# Patient Record
Sex: FEMALE | Race: WHITE | NOT HISPANIC OR LATINO | Employment: OTHER | URBAN - NONMETROPOLITAN AREA
[De-identification: names, ages, dates, MRNs, and addresses within clinical notes are randomized per-mention and may not be internally consistent; named-entity substitution may affect disease eponyms.]

---

## 2018-08-06 ENCOUNTER — OFFICE VISIT (OUTPATIENT)
Dept: SURGERY | Facility: CLINIC | Age: 58
End: 2018-08-06

## 2018-08-06 DIAGNOSIS — K82.8 BILIARY DYSKINESIA: Primary | ICD-10-CM

## 2018-08-06 DIAGNOSIS — Z85.038 PERSONAL HISTORY OF COLON CANCER: ICD-10-CM

## 2018-08-06 PROCEDURE — 99244 OFF/OP CNSLTJ NEW/EST MOD 40: CPT | Performed by: SURGERY

## 2018-08-06 RX ORDER — SIMVASTATIN 40 MG
TABLET ORAL
Refills: 5 | COMMUNITY
Start: 2018-07-10

## 2018-08-06 RX ORDER — CLONIDINE HYDROCHLORIDE 0.1 MG/1
0.1 TABLET ORAL DAILY
Refills: 5 | COMMUNITY
Start: 2018-07-10

## 2018-08-06 RX ORDER — QUETIAPINE FUMARATE 100 MG/1
TABLET, FILM COATED ORAL
Refills: 5 | COMMUNITY
Start: 2018-07-10 | End: 2019-06-18 | Stop reason: SDUPTHER

## 2018-08-06 RX ORDER — ATENOLOL 50 MG/1
50 TABLET ORAL DAILY
Refills: 5 | COMMUNITY
Start: 2018-07-10

## 2018-08-06 RX ORDER — LEVOTHYROXINE SODIUM 0.15 MG/1
150 TABLET ORAL DAILY
Refills: 5 | COMMUNITY
Start: 2018-07-10

## 2018-08-06 RX ORDER — OMEPRAZOLE 20 MG/1
20 CAPSULE, DELAYED RELEASE ORAL DAILY
Refills: 5 | COMMUNITY
Start: 2018-07-10 | End: 2019-08-19 | Stop reason: SDUPTHER

## 2018-08-06 RX ORDER — FENOFIBRATE 200 MG/1
200 CAPSULE ORAL DAILY
Refills: 5 | COMMUNITY
Start: 2018-07-10

## 2018-08-06 RX ORDER — CITALOPRAM 10 MG/1
10 TABLET ORAL DAILY
Refills: 5 | COMMUNITY
Start: 2018-07-26 | End: 2019-07-29

## 2018-08-06 RX ORDER — ESCITALOPRAM OXALATE 10 MG/1
10 TABLET ORAL
Refills: 2 | COMMUNITY
Start: 2018-07-26 | End: 2019-06-18 | Stop reason: SDUPTHER

## 2018-08-06 RX ORDER — HYDROXYZINE HYDROCHLORIDE 25 MG/1
TABLET, FILM COATED ORAL
Refills: 5 | COMMUNITY
Start: 2018-07-10 | End: 2019-06-18 | Stop reason: SDUPTHER

## 2018-08-06 RX ORDER — DICYCLOMINE HYDROCHLORIDE 10 MG/1
CAPSULE ORAL
Refills: 1 | COMMUNITY
Start: 2018-07-26

## 2018-08-06 RX ORDER — BUSPIRONE HYDROCHLORIDE 10 MG/1
TABLET ORAL
Refills: 2 | COMMUNITY
Start: 2018-07-26 | End: 2019-06-18 | Stop reason: SDUPTHER

## 2018-08-06 NOTE — PROGRESS NOTES
Patient: Maru Meyer    YOB: 1960    Date: 08/06/2018    Primary Care Provider: Haleigh Gomez MD    Chief Complaint   Patient presents with   • Abdominal Pain     epigastric pain       Subjective .     History of present illness:  Patient with a 2-3 year history of abdominal pain and nausea and sometimes dry heaves.  5 or 6 times over that period of time she's had severe dry heaving and nausea but otherwise usually only has some nausea and crampy upper abdominal pain.  The pain is across the upper abdomen.  Nothing specifically aggravates or alleviates her symptoms although she has started dicyclomine and that has helped somewhat.  She underwent a HIDA scan that did not reproduce her pain but she did have nausea with it.    The following portions of the patient's history were reviewed and updated as appropriate: allergies, current medications, past family history, past medical history, past social history, past surgical history and problem list.    Review of Systems   Constitutional: Negative for chills, fever and unexpected weight change.   HENT: Negative for hearing loss, trouble swallowing and voice change.    Eyes: Negative for visual disturbance.   Respiratory: Negative for apnea, cough, chest tightness, shortness of breath and wheezing.    Cardiovascular: Negative for chest pain, palpitations and leg swelling.   Gastrointestinal: Positive for abdominal pain, diarrhea, nausea and vomiting. Negative for abdominal distention, anal bleeding, blood in stool, constipation and rectal pain.   Endocrine: Negative for cold intolerance and heat intolerance.   Genitourinary: Negative for difficulty urinating, dysuria and flank pain.   Musculoskeletal: Negative for back pain and gait problem.   Skin: Negative for color change, rash and wound.   Neurological: Negative for dizziness, syncope, speech difficulty, weakness, light-headedness, numbness and headaches.   Hematological: Negative for adenopathy.  Does not bruise/bleed easily.   Psychiatric/Behavioral: Negative for confusion. The patient is not nervous/anxious.        History:  Past Medical History:   Diagnosis Date   • Anxiety    • Cancer (CMS/HCC)     colon   • Colon cancer (CMS/HCC)    • Disease of thyroid gland    • Hyperlipidemia    • Hypertension        Past Surgical History:   Procedure Laterality Date   • COLECTOMY PARTIAL / TOTAL     • MASTECTOMY     • THYROIDECTOMY         Family History   Problem Relation Age of Onset   • Thyroid cancer Mother    • Heart disease Mother    • Stroke Father    • Cancer Father    • Cancer Maternal Uncle    • Heart disease Maternal Grandmother    • Heart attack Paternal Grandmother    • Heart disease Paternal Grandmother    • Cancer Paternal Grandfather        Social History   Substance Use Topics   • Smoking status: Former Smoker   • Smokeless tobacco: Never Used   • Alcohol use No       Allergies:  No Known Allergies    Medications:    Current Outpatient Prescriptions:   •  atenolol (TENORMIN) 50 MG tablet, Take 50 mg by mouth Daily., Disp: , Rfl: 5  •  busPIRone (BUSPAR) 10 MG tablet, TAKE 1 2 TABLET TWICE DAILY FOR 7 DAYS, THEN 1 TABLET TWICE DAILY, Disp: , Rfl: 2  •  citalopram (CeleXA) 10 MG tablet, Take 10 mg by mouth Daily., Disp: , Rfl: 5  •  CloNIDine (CATAPRES) 0.1 MG tablet, Take 0.1 mg by mouth Daily., Disp: , Rfl: 5  •  dicyclomine (BENTYL) 10 MG capsule, TAKE 1 CAPSULE EVERY 6 HOURS AS NEEDED FOR SPASMS, Disp: , Rfl: 1  •  escitalopram (LEXAPRO) 10 MG tablet, Take 10 mg by mouth every night at bedtime., Disp: , Rfl: 2  •  fenofibrate micronized (LOFIBRA) 200 MG capsule, Take 200 mg by mouth Daily., Disp: , Rfl: 5  •  hydrOXYzine (ATARAX) 25 MG tablet, TAKE 1 2 TABLETS EVERY 6 HOURS, Disp: , Rfl: 5  •  levothyroxine (SYNTHROID, LEVOTHROID) 150 MCG tablet, Take 150 mcg by mouth Daily., Disp: , Rfl: 5  •  omeprazole (priLOSEC) 20 MG capsule, Take 20 mg by mouth Daily., Disp: , Rfl: 5  •  QUEtiapine  (SEROquel) 100 MG tablet, TAKE 1 5 4 TABLETS AT BEDTIME, Disp: , Rfl: 5  •  simvastatin (ZOCOR) 40 MG tablet, TAKE 1 TABLET EACH EVENING, Disp: , Rfl: 5    Objective     Vital Signs:   There were no vitals filed for this visit.    Physical Exam:   General Appearance:    Alert, cooperative, in no acute distress   Head:    Normocephalic, without obvious abnormality, atraumatic   Eyes:            Lids and lashes normal, conjunctivae and sclerae normal, no   icterus, no pallor, corneas clear, PERRLA   Ears:    Ears appear intact with no abnormalities noted   Lungs:     Clear to auscultation,respirations regular, even and                  unlabored    Heart:    Regular rhythm and normal rate, normal S1 and S2, no            murmur   Abdomen:     no masses, no organomegaly, soft non-tender, non-distended, no guarding,Well-healed midline incision.     Extremities:   Moves all extremities well, no edema, no cyanosis, no             redness   Skin:   No bleeding, bruising or rash   Neurologic:   Cranial nerves 2 - 12 grossly intact, sensation intact   Psychiatric: No evidence of depression or anxiety      Results Review:   I reviewed the patient's new clinical results.  Labs, ultrasound and HIDA scan were reviewed    Review of Systems was reviewed and confirmed as accurate today.    Assessment/Plan :    1. Biliary dyskinesia    2. Personal history of colon cancer        I had a detailed conversation with the patient.  She does have an abnormal HIDA scan and I explained to her this diagnosis.  Certainly the gallbladder can be the etiology for her symptoms but she understands that in 10-15% of time removing the gallbladder will not help.  Extremely anxious to have any surgical procedures performed and is not immediately interested in having her gallbladder surgery.  She is most concerned about the risks of not operating and I explained to her that the risks are relatively minimal and would include acute cholecystitis which may  mean more urgent surgery.  She is at somewhat higher risk given her colectomy in terms of possible adhesions etc.  She understands this.  Patient wants to give it some more thought and will follow-up with me if she wants to have her gallbladder removed.  Patient also with personal history of colon cancer and a total proctocolectomy was recommended at the time but underwent a subtotal colectomy and has not had a flexible sigmoidoscopy in 11 years.  I explained to her that this is important since she had multiple colon cancers leading to that surgery.  She'll bring me her previous surgical details and we'll discuss endoscopy but for now she does not wish to proceed.  Again she is somewhat anxious about any procedure.  I think there is minimal risk with endoscopy.    Electronically signed by Teja Beltrán MD  08/06/18    Portions of this note have been scribed for Teja Beltrán MD by Michelle Milligan. 8/6/2018  4:20 PM

## 2019-06-18 ENCOUNTER — OFFICE VISIT (OUTPATIENT)
Dept: PSYCHIATRY | Facility: CLINIC | Age: 59
End: 2019-06-18

## 2019-06-18 VITALS
DIASTOLIC BLOOD PRESSURE: 73 MMHG | SYSTOLIC BLOOD PRESSURE: 147 MMHG | HEIGHT: 66 IN | WEIGHT: 244 LBS | BODY MASS INDEX: 39.21 KG/M2 | HEART RATE: 61 BPM

## 2019-06-18 DIAGNOSIS — F51.05 INSOMNIA DUE TO MENTAL CONDITION: ICD-10-CM

## 2019-06-18 DIAGNOSIS — F41.1 GENERALIZED ANXIETY DISORDER: ICD-10-CM

## 2019-06-18 DIAGNOSIS — F33.2 SEVERE EPISODE OF RECURRENT MAJOR DEPRESSIVE DISORDER, WITHOUT PSYCHOTIC FEATURES (HCC): Primary | ICD-10-CM

## 2019-06-18 PROCEDURE — 90792 PSYCH DIAG EVAL W/MED SRVCS: CPT | Performed by: NURSE PRACTITIONER

## 2019-06-18 RX ORDER — HYDROXYZINE HYDROCHLORIDE 25 MG/1
TABLET, FILM COATED ORAL
Qty: 180 TABLET | Refills: 2
Start: 2019-06-18

## 2019-06-18 RX ORDER — QUETIAPINE FUMARATE 300 MG/1
300 TABLET, FILM COATED ORAL NIGHTLY
Qty: 30 TABLET | Refills: 2
Start: 2019-06-18 | End: 2019-08-19 | Stop reason: SDUPTHER

## 2019-06-18 RX ORDER — FAMOTIDINE 20 MG/1
20 TABLET, FILM COATED ORAL
Refills: 2 | COMMUNITY
Start: 2019-04-03 | End: 2019-08-19 | Stop reason: SDUPTHER

## 2019-06-18 RX ORDER — BUSPIRONE HYDROCHLORIDE 10 MG/1
10 TABLET ORAL 2 TIMES DAILY
Qty: 60 TABLET | Refills: 0
Start: 2019-06-18 | End: 2019-08-19 | Stop reason: SDUPTHER

## 2019-06-18 RX ORDER — ESCITALOPRAM OXALATE 10 MG/1
15 TABLET ORAL
Qty: 45 TABLET | Refills: 2 | Status: SHIPPED | OUTPATIENT
Start: 2019-06-18 | End: 2019-07-29 | Stop reason: SDUPTHER
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
## 2019-07-29 RX ORDER — ESCITALOPRAM OXALATE 10 MG/1
TABLET ORAL
Qty: 45 TABLET | Refills: 0 | Status: SHIPPED | OUTPATIENT
Start: 2019-07-29 | End: 2019-07-31 | Stop reason: SDUPTHER

## 2019-07-30 ENCOUNTER — TELEPHONE (OUTPATIENT)
Dept: PSYCHIATRY | Facility: CLINIC | Age: 59
End: 2019-07-30

## 2019-07-30 NOTE — TELEPHONE ENCOUNTER
Patient showed up for an apt today at [facility] did not have an apt for her. She stated she was doing fine and could r/s but she said that she was already taken ing 20mg of Lexapro and you wanted her to increase it to 25mg says she has been taking 2 10mg but has not increase it so she request Lexapro 20mg daily and the 5 increase call in. Stated she has always been on the 20mg that it was on her paperwork when she seen you last. Please Advise

## 2019-07-31 ENCOUNTER — TELEPHONE (OUTPATIENT)
Dept: PSYCHIATRY | Facility: CLINIC | Age: 59
End: 2019-07-31

## 2019-07-31 RX ORDER — ESCITALOPRAM OXALATE 20 MG/1
20 TABLET ORAL DAILY
Qty: 30 TABLET | Refills: 1 | Status: SHIPPED | OUTPATIENT
Start: 2019-07-31 | End: 2019-08-19 | Stop reason: SDUPTHER

## 2019-07-31 NOTE — TELEPHONE ENCOUNTER
Lexapro 15mg was sent in yesterday, and the patient was expecting a higher dose. I see the last note said no higher than 20mg, but did you mean to send in 20mg or 15mg?

## 2019-07-31 NOTE — TELEPHONE ENCOUNTER
Called and left message for patient to only take the 20mg she was sent in 10 mg tablets and I told her to call when a refill is due

## 2019-08-19 ENCOUNTER — TELEMEDICINE (OUTPATIENT)
Dept: PSYCHIATRY | Facility: CLINIC | Age: 59
End: 2019-08-19

## 2019-08-19 VITALS
DIASTOLIC BLOOD PRESSURE: 77 MMHG | HEIGHT: 66 IN | WEIGHT: 249 LBS | BODY MASS INDEX: 40.02 KG/M2 | HEART RATE: 57 BPM | SYSTOLIC BLOOD PRESSURE: 150 MMHG

## 2019-08-19 DIAGNOSIS — F41.1 GENERALIZED ANXIETY DISORDER: ICD-10-CM

## 2019-08-19 DIAGNOSIS — F33.2 SEVERE EPISODE OF RECURRENT MAJOR DEPRESSIVE DISORDER, WITHOUT PSYCHOTIC FEATURES (HCC): Primary | ICD-10-CM

## 2019-08-19 DIAGNOSIS — F51.05 INSOMNIA DUE TO MENTAL CONDITION: ICD-10-CM

## 2019-08-19 PROCEDURE — 99213 OFFICE O/P EST LOW 20 MIN: CPT | Performed by: NURSE PRACTITIONER

## 2019-08-19 RX ORDER — ESCITALOPRAM OXALATE 20 MG/1
20 TABLET ORAL DAILY
Qty: 30 TABLET | Refills: 0 | Status: SHIPPED | OUTPATIENT
Start: 2019-08-19 | End: 2019-09-16 | Stop reason: SDUPTHER

## 2019-08-19 RX ORDER — BUSPIRONE HYDROCHLORIDE 15 MG/1
15 TABLET ORAL
Qty: 30 TABLET | Refills: 0 | Status: SHIPPED | OUTPATIENT
Start: 2019-08-19 | End: 2019-09-16 | Stop reason: SDUPTHER

## 2019-08-19 RX ORDER — QUETIAPINE FUMARATE 400 MG/1
400 TABLET, FILM COATED ORAL NIGHTLY
Qty: 30 TABLET | Refills: 0 | Status: SHIPPED | OUTPATIENT
Start: 2019-08-19 | End: 2019-09-16 | Stop reason: SDUPTHER

## 2019-08-19 RX ORDER — OMEPRAZOLE 40 MG/1
40 CAPSULE, DELAYED RELEASE ORAL DAILY
Refills: 0 | COMMUNITY
Start: 2019-08-01

## 2019-08-19 NOTE — PROGRESS NOTES
"      Blue Meyer is a 58 y.o. female is here today for medication management follow-up.    This provider is located at [facility],  [address]  The patient  is seen remotely at [facility] in [place], [place] using [employer], an encrypted service from one [facility] to another,  without staff present.    The patient’s condition being diagnosed/treated is appropriate for telemedicine. The provider identified him/herself as well as his or her credentials.     The patient and/or patients guardian consent to be seen remotely, and when consent is given they understand that the consent allows for patient identifiable information to be sent to a third party as needed.   They may refuse to be seen remotely at any time.  The electronic data is encrypted and password protected, and the patient has been advised of the potential risks to privacy notwithstanding such measures.      Chief Complaint: depression and anxiety      History of Present Illness:    Patient presents today for a follow up for medication management for depression and anxiety.  Patient states that last appointment there was a  misunderstanding and she had told the provider she was only on 10 mg of Lexapro.  Patient states she was actually already on the 20 mg of Lexapro.  Patient reports taking herself up to 25 mg of Lexapro by halving the 10 mg that was called in for her. Patient states when she did this she became really nervous, irritable, jumpy, \"snappy\".  Patient states she also could not get out of bed.  Patient states this past week she did start taking the 20 mg again.  Patient states her depression is at a 8-1/2 to a 9 on a 0-to-10 scale with 10 being the worst.  Patient states she has symptoms of depression of decreased energy, decreased motivation, wanting to isolate herself, and crying spells.  Patient states her anxiety is at a 8 on a 0-to-10 scale with 10 being the " "worst.  Patient states she is always had anxiety.  Patient has symptoms of anxiety feeling on edge and constantly worrying.  Patient states she does have panic attacks when she thinks of her mother and these occur about twice a week.  Patient states these occur mostly at night and only last about 1 minute.  Patient states during panic attack she gets very hot and began sweating, gets cold and clammy, and gets very shaky.  Patient states she is not sleeping very well and is only getting about 5 hours of sleep at night.  Patient states she has been doing a lot of nighttime eating and being \"on the floor \".  Patient states she is still taking the 300 mg of Seroquel as well as her other medications and these are not working as much.  Patient states she was on 400 mg of Seroquel and tried to bring herself down however her sleep has been affected.  Patient denies any nightmares but states she does have a lot of dreams.  Patient reports her appetite is same and eating about 3 meals per day.  Patient states she has not been taking the BuSpar in the morning that all of her medication she takes once at night.  Patient denies any auditory or visual hallucinations.  Patient adamantly denies any SI or HI.  Patient denies any medical changes since last visit.  Patient denies any side effects to medications.  The following portions of the patient's history were reviewed and updated as appropriate: allergies, current medications, past family history, past medical history, past social history, past surgical history and problem list.    Review of Systems   Constitutional: Negative.    Respiratory: Negative.    Cardiovascular: Negative.    Psychiatric/Behavioral: Positive for agitation, dysphoric mood and sleep disturbance. The patient is nervous/anxious.        Objective   Physical Exam   Constitutional: Vital signs are normal. She appears well-developed and well-nourished. She is cooperative.   Neurological: She is alert. " "  Psychiatric: Her speech is normal and behavior is normal. Judgment and thought content normal. Cognition and memory are normal. She exhibits a depressed mood.   Vitals reviewed.    Blood pressure 150/77, pulse 57, height 167.6 cm (65.98\"), weight 113 kg (249 lb). Body mass index is 40.21 kg/m².    No Known Allergies    Medication List:   Current Outpatient Medications   Medication Sig Dispense Refill   • atenolol (TENORMIN) 50 MG tablet Take 50 mg by mouth Daily.  5   • busPIRone (BUSPAR) 15 MG tablet Take 1 tablet by mouth every night at bedtime. 30 tablet 0   • CloNIDine (CATAPRES) 0.1 MG tablet Take 0.1 mg by mouth Daily.  5   • dicyclomine (BENTYL) 10 MG capsule TAKE 1 CAPSULE EVERY 6 HOURS AS NEEDED FOR SPASMS  1   • escitalopram (LEXAPRO) 20 MG tablet Take 1 tablet by mouth Daily. 30 tablet 0   • fenofibrate micronized (LOFIBRA) 200 MG capsule Take 200 mg by mouth Daily.  5   • hydrOXYzine (ATARAX) 25 MG tablet Take 1-2 tablets by mouth every 6 hours as needed for anxiety/itching 180 tablet 2   • levothyroxine (SYNTHROID, LEVOTHROID) 150 MCG tablet Take 150 mcg by mouth Daily.  5   • omeprazole (priLOSEC) 40 MG capsule Take 40 mg by mouth Daily.  0   • QUEtiapine (SEROquel) 400 MG tablet Take 1 tablet by mouth Every Night. 30 tablet 0   • simvastatin (ZOCOR) 40 MG tablet TAKE 1 TABLET EACH EVENING  5     No current facility-administered medications for this visit.        Mental Status Exam:   Hygiene:   good  Cooperation:  Cooperative  Eye Contact:  Good  Psychomotor Behavior:  Appropriate  Affect:  Appropriate  Hopelessness: Denies  Speech:  Normal  Thought Process:  Goal directed and Linear  Thought Content:  Normal  Suicidal:  None  Homicidal:  None  Hallucinations:  None  Delusion:  None  Memory:  Intact  Orientation:  Person, Place, Time and Situation  Reliability:  fair  Insight:  Fair  Judgement:  Fair  Impulse Control:  Fair  Physical/Medical Issues:  No              Assessment/Plan   Diagnoses and " all orders for this visit:    Severe episode of recurrent major depressive disorder, without psychotic features (CMS/HCC)  -     escitalopram (LEXAPRO) 20 MG tablet; Take 1 tablet by mouth Daily.  -     QUEtiapine (SEROquel) 400 MG tablet; Take 1 tablet by mouth Every Night.    Generalized anxiety disorder  -     busPIRone (BUSPAR) 15 MG tablet; Take 1 tablet by mouth every night at bedtime.  -     QUEtiapine (SEROquel) 400 MG tablet; Take 1 tablet by mouth Every Night.    Insomnia due to mental condition  -     QUEtiapine (SEROquel) 400 MG tablet; Take 1 tablet by mouth Every Night.      Discussed medication options with patient.  Increase Seroquel to 400 mg and change BuSpar to 15 mg once at nighttime.  Continue Lexapro 20 mg.  Discussed with patient risks, benefits, and side effects of medication.  Patient acknowledged and verbally consented.  Discussed with patient not adjusting medication without first discussing with provider.  Discussed with patient if having any questions, concerns, or worsening of symptoms to call the [facility].  Discussed with patient if having any SI or HI to call 911 or go to the nearest ER.  Patient agreeable to current plan.         Follow up in four weeks.     Errors in dictation may reflect use of voice recognition software and not all errors in transcription may have been detected prior to signing.            This document has been electronically signed by ARTEMIO Escobar   August 19, 2019 1:37 PM

## 2019-08-26 ENCOUNTER — TELEPHONE (OUTPATIENT)
Dept: PSYCHIATRY | Facility: CLINIC | Age: 59
End: 2019-08-26

## 2019-08-26 NOTE — TELEPHONE ENCOUNTER
Patient called this morning stating that she belevies that she had a allergic reaction to the increase of Buspar she stated that she had only been taking 10 mg at night and the increase was for 15 mg. Patient stated that she was having really bad pains in her buttock that she was unable to sit stated that she was having pain in her left side, with bad headaches, legs cramping, and hard to walk. Per verbal order to return call to patient provider Sosa Sosa suggest that patient follows up with her PCP or her nearest ER today for follow up medical care. Told patient to call us back this afternoon to do a follow up with her.

## 2019-09-16 ENCOUNTER — TELEMEDICINE (OUTPATIENT)
Dept: PSYCHIATRY | Facility: CLINIC | Age: 59
End: 2019-09-16

## 2019-09-16 VITALS
BODY MASS INDEX: 38.57 KG/M2 | DIASTOLIC BLOOD PRESSURE: 69 MMHG | HEART RATE: 58 BPM | SYSTOLIC BLOOD PRESSURE: 138 MMHG | WEIGHT: 240 LBS | HEIGHT: 66 IN

## 2019-09-16 DIAGNOSIS — F33.2 SEVERE EPISODE OF RECURRENT MAJOR DEPRESSIVE DISORDER, WITHOUT PSYCHOTIC FEATURES (HCC): ICD-10-CM

## 2019-09-16 DIAGNOSIS — F51.05 INSOMNIA DUE TO MENTAL CONDITION: ICD-10-CM

## 2019-09-16 DIAGNOSIS — F41.1 GENERALIZED ANXIETY DISORDER: ICD-10-CM

## 2019-09-16 PROCEDURE — 99213 OFFICE O/P EST LOW 20 MIN: CPT | Performed by: NURSE PRACTITIONER

## 2019-09-16 RX ORDER — QUETIAPINE FUMARATE 400 MG/1
400 TABLET, FILM COATED ORAL NIGHTLY
Qty: 30 TABLET | Refills: 0 | Status: SHIPPED | OUTPATIENT
Start: 2019-09-16 | End: 2019-10-25 | Stop reason: SDUPTHER

## 2019-09-16 RX ORDER — BUSPIRONE HYDROCHLORIDE 15 MG/1
15 TABLET ORAL
Qty: 30 TABLET | Refills: 0 | Status: SHIPPED | OUTPATIENT
Start: 2019-09-16 | End: 2019-10-28 | Stop reason: SDUPTHER

## 2019-09-16 RX ORDER — ESCITALOPRAM OXALATE 20 MG/1
20 TABLET ORAL DAILY
Qty: 30 TABLET | Refills: 0 | Status: SHIPPED | OUTPATIENT
Start: 2019-09-16 | End: 2019-10-28 | Stop reason: SDUPTHER

## 2019-09-16 RX ORDER — METHYLPREDNISOLONE 4 MG/1
TABLET ORAL
Refills: 0 | COMMUNITY
Start: 2019-09-13

## 2019-09-16 NOTE — PROGRESS NOTES
Subjective   Maru Meyer is a 58 y.o. female is here today for medication management follow-up.    This provider is located at [facility],  [address]  The patient  is seen remotely at [facility] in [place], [place] using [employer], an encrypted service from one [facility] facility to another,  without staff present.    The patient’s condition being diagnosed/treated is appropriate for telemedicine. The provider identified him/herself as well as his or her credentials.     The patient and/or patients guardian consent to be seen remotely, and when consent is given they understand that the consent allows for patient identifiable information to be sent to a third party as needed.   They may refuse to be seen remotely at any time.  The electronic data is encrypted and password protected, and the patient has been advised of the potential risks to privacy notwithstanding such measures.      Chief Complaint: depression and anxiety      History of Present Illness:   Patient presents today for follow-up for medication management.  Patient states she did go to her PCP regarding the pain she was having at her last appointment.  Patient reports they did multiple x-rays as well as MRI to figure out what was going on.  Patient states they found out she has a bone spur on her cheekbone and due to adjusting pressure she has messed up her right knee.  Patient states where she has been putting more pressure and weight on her right knee it is now messed that one up.  Patient states they started her on prednisone as well as giving her shots in her hip.  Patient states she has been doing the Dosepak of prednisone.  Patient reports she goes back to her PCP regarding the pain on Wednesday.  Patient states her appetite has decreased and she is only eating about 1 meal per day.  Patient denies any nausea or vomiting.  Patient states she just does not have an appetite.  Patient  "states it has been very hectic at her house due to moving furniture and one daughter moving in with her.  Patient states her depression currently is at a 5 on a 0-to-10 scale with 10 being the worst.  Patient exhibits symptoms of depression of decreased energy, decreased motivation, and occasional crying spells.  Patient states her anxiety is hard to  and rate.  Patient states she feels a lot of it is due to her pain.  Patient reports she does feel nervous, startle easy, and feel on edge.  Patient denies any panic attacks.  Patient states since she went up on the 400 of Seroquel she is sleeping good and only wakes up occasionally when she is in a lot of pain.  Patient denies any nightmares.  Patient denies any auditory or visual hallucinations.  Patient adamantly denies any SI or HI.  Patient denies any side effects to medication.  The following portions of the patient's history were reviewed and updated as appropriate: allergies, current medications, past family history, past medical history, past social history, past surgical history and problem list.    Review of Systems   Constitutional: Positive for appetite change.   Respiratory: Negative.    Cardiovascular: Negative.    Psychiatric/Behavioral: Positive for dysphoric mood. The patient is nervous/anxious.        Objective   Physical Exam   Constitutional: Vital signs are normal. She appears well-developed and well-nourished. She is cooperative.   Neurological: She is alert.   Psychiatric: Her speech is normal and behavior is normal. Judgment and thought content normal. Her mood appears anxious. Cognition and memory are normal.   Vitals reviewed.    Blood pressure 138/69, pulse 58, height 167.6 cm (65.98\"), weight 109 kg (240 lb). Body mass index is 38.76 kg/m².    No Known Allergies    Medication List:   Current Outpatient Medications   Medication Sig Dispense Refill   • atenolol (TENORMIN) 50 MG tablet Take 50 mg by mouth Daily.  5   • busPIRone (BUSPAR) " 15 MG tablet Take 1 tablet by mouth every night at bedtime. 30 tablet 0   • CloNIDine (CATAPRES) 0.1 MG tablet Take 0.1 mg by mouth Daily.  5   • dicyclomine (BENTYL) 10 MG capsule TAKE 1 CAPSULE EVERY 6 HOURS AS NEEDED FOR SPASMS  1   • escitalopram (LEXAPRO) 20 MG tablet Take 1 tablet by mouth Daily. 30 tablet 0   • fenofibrate micronized (LOFIBRA) 200 MG capsule Take 200 mg by mouth Daily.  5   • hydrOXYzine (ATARAX) 25 MG tablet Take 1-2 tablets by mouth every 6 hours as needed for anxiety/itching 180 tablet 2   • levothyroxine (SYNTHROID, LEVOTHROID) 150 MCG tablet Take 150 mcg by mouth Daily.  5   • methylPREDNISolone (MEDROL, CHEMO,) 4 MG tablet TAKE PER PACKAGE INSTRUCTIONS  0   • omeprazole (priLOSEC) 40 MG capsule Take 40 mg by mouth Daily.  0   • QUEtiapine (SEROquel) 400 MG tablet Take 1 tablet by mouth Every Night. 30 tablet 0   • simvastatin (ZOCOR) 40 MG tablet TAKE 1 TABLET EACH EVENING  5     No current facility-administered medications for this visit.        Mental Status Exam:   Hygiene:   good  Cooperation:  Cooperative  Eye Contact:  Good  Psychomotor Behavior:  Appropriate  Affect:  Appropriate  Hopelessness: Denies  Speech:  Normal  Thought Process:  Goal directed and Linear  Thought Content:  Normal  Suicidal:  None  Homicidal:  None  Hallucinations:  None  Delusion:  None  Memory:  Intact  Orientation:  Person, Place, Time and Situation  Reliability:  fair  Insight:  Fair  Judgement:  Fair  Impulse Control:  Fair  Physical/Medical Issues:  No                Assessment/Plan   Diagnoses and all orders for this visit:    Generalized anxiety disorder  -     busPIRone (BUSPAR) 15 MG tablet; Take 1 tablet by mouth every night at bedtime.  -     QUEtiapine (SEROquel) 400 MG tablet; Take 1 tablet by mouth Every Night.    Severe episode of recurrent major depressive disorder, without psychotic features (CMS/HCC)  -     escitalopram (LEXAPRO) 20 MG tablet; Take 1 tablet by mouth Daily.  -     QUEtiapine  (SEROquel) 400 MG tablet; Take 1 tablet by mouth Every Night.    Insomnia due to mental condition  -     QUEtiapine (SEROquel) 400 MG tablet; Take 1 tablet by mouth Every Night.            Discussed medication options.  Continue BuSpar 15 mg, Seroquel 400 mg, and Lexapro 20 mg.  Reviewed the risks, benefits, and side effects of the medications; patient acknowledged and verbally consented.Patient was instructed on medication side effects, benefits, and also of no treatment.  Patient was given an explanation regarding potential for increased risk of diabetes, lipids, and weight gain.  Labs will be assessed as clinically indicated.  Diet was discussed especially healthy diet choices and increasing activity and exercise.  Patient was strongly urged to continue weight maintance or weight loss efforts.  Patient reported verbalized understanding of instructions. Patient is agreeable to call the [facility] if having any questions, concerns, or worsening of symptoms.  Patient is aware to call 911 or go to the nearest ER should begin having SI/HI.        Follow up in four weeks.       Errors in dictation may reflect use of voice recognition software and not all errors in transcription may have been detected prior to signing.               This document has been electronically signed by ARTEMIO Escobar   September 16, 2019 1:32 PM

## 2019-10-15 ENCOUNTER — OFFICE VISIT (OUTPATIENT)
Dept: NEUROSURGERY | Facility: CLINIC | Age: 59
End: 2019-10-15

## 2019-10-15 DIAGNOSIS — M41.20 SCOLIOSIS (AND KYPHOSCOLIOSIS), IDIOPATHIC: Primary | ICD-10-CM

## 2019-10-15 DIAGNOSIS — M51.36 DDD (DEGENERATIVE DISC DISEASE), LUMBAR: ICD-10-CM

## 2019-10-15 DIAGNOSIS — M47.816 FACET ARTHRITIS OF LUMBAR REGION: ICD-10-CM

## 2019-10-15 PROBLEM — M51.369 DDD (DEGENERATIVE DISC DISEASE), LUMBAR: Status: ACTIVE | Noted: 2019-10-15

## 2019-10-15 PROCEDURE — 99243 OFF/OP CNSLTJ NEW/EST LOW 30: CPT | Performed by: NEUROLOGICAL SURGERY

## 2019-10-15 NOTE — PROGRESS NOTES
Subjective   Patient ID: Maru eMyer is a 58 y.o. 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  She has had chiropractic therapy off and on for 30 years.  About 6 weeks ago she had a sharp severe pain in her left hip.  That has improved spontaneously and significantly, but she still feels discomfort there when she sits with pressure on the left hip.    Review of Radiographic Studies:  Lumbar MRI scan was done on 8/26/2019 and shows a mild degree of lumbar scoliosis, multiple level degenerative disc and facet disease, but no significant stenosis, no instability, no significant disc herniation or nerve root compression that would explain the left hip discomfort.    The following portions of the patient's history were reviewed, updated as appropriate and approved: allergies, current medications, past family history, past medical history, past social history, past surgical history, review of systems and problem list.  Review of Systems   Musculoskeletal: Positive for arthralgias, back pain, myalgias and neck pain.   Neurological: Positive for numbness.       Objective     NEUROLOGICAL EXAMINATION:      MENTAL STATUS:  Alert and oriented.  Speech intact.  Recent and remote memory intact.      CRANIAL NERVES:  Cranial nerve II:  Visual fields are full.  Cranial nerves III, IV and VI:  PERRLADC.  Extraocular movements are intact.  Nystagmus is not present.  Cranial nerve V:  Facial sensation is intact.  Cranial nerve VII:  Muscles of facial expression reveal no asymmetry.  Cranial nerve VIII:  Hearing is intact.  Cranial nerves IX and X:  Palate elevates symmetrically.  Cranial nerve XI:   Shoulder shrug is intact.  Cranial nerve XII:  Tongue is midline without evidence of atrophy or fasciculation.    MUSCULOSKELETAL: SLR negative bilaterally.  Hip rotation negative for pain on the left side.    MOTOR: Knee extension, ankle dorsiflexion, plantar flexion intact.    SENSATION: Intact to touch sensation over the lower extremities.    REFLEXES:  DTR trace both knees and both ankles.    Assessment   Multilevel degenerative disc and facet disease without evidence of radiculopathy, neurogenic claudication, or instability.  Mild degenerative lumbar scoliosis.       Plan   Continue with chiropractic treatment as needed.  No need for further neurosurgical treatment to the lumbar spine.       Sumit Geller MD

## 2019-10-25 DIAGNOSIS — F51.05 INSOMNIA DUE TO MENTAL CONDITION: ICD-10-CM

## 2019-10-25 DIAGNOSIS — F33.2 SEVERE EPISODE OF RECURRENT MAJOR DEPRESSIVE DISORDER, WITHOUT PSYCHOTIC FEATURES (HCC): ICD-10-CM

## 2019-10-25 DIAGNOSIS — F41.1 GENERALIZED ANXIETY DISORDER: ICD-10-CM

## 2019-10-25 RX ORDER — QUETIAPINE FUMARATE 400 MG/1
400 TABLET, FILM COATED ORAL NIGHTLY
Qty: 30 TABLET | Refills: 0 | Status: SHIPPED | OUTPATIENT
Start: 2019-10-25 | End: 2019-11-25 | Stop reason: SDUPTHER

## 2019-10-26 RX ORDER — QUETIAPINE FUMARATE 400 MG/1
TABLET, FILM COATED ORAL
Qty: 30 TABLET | Refills: 0 | OUTPATIENT
Start: 2019-10-26

## 2019-10-28 DIAGNOSIS — F41.1 GENERALIZED ANXIETY DISORDER: ICD-10-CM

## 2019-10-28 DIAGNOSIS — F33.2 SEVERE EPISODE OF RECURRENT MAJOR DEPRESSIVE DISORDER, WITHOUT PSYCHOTIC FEATURES (HCC): ICD-10-CM

## 2019-10-28 RX ORDER — BUSPIRONE HYDROCHLORIDE 15 MG/1
15 TABLET ORAL
Qty: 30 TABLET | Refills: 0 | Status: SHIPPED | OUTPATIENT
Start: 2019-10-28 | End: 2019-12-09 | Stop reason: SDUPTHER

## 2019-10-28 RX ORDER — ESCITALOPRAM OXALATE 20 MG/1
20 TABLET ORAL DAILY
Qty: 30 TABLET | Refills: 0 | Status: SHIPPED | OUTPATIENT
Start: 2019-10-28 | End: 2020-01-06

## 2019-11-23 DIAGNOSIS — F41.1 GENERALIZED ANXIETY DISORDER: ICD-10-CM

## 2019-11-23 DIAGNOSIS — F33.2 SEVERE EPISODE OF RECURRENT MAJOR DEPRESSIVE DISORDER, WITHOUT PSYCHOTIC FEATURES (HCC): ICD-10-CM

## 2019-11-23 DIAGNOSIS — F51.05 INSOMNIA DUE TO MENTAL CONDITION: ICD-10-CM

## 2019-11-25 DIAGNOSIS — F33.2 SEVERE EPISODE OF RECURRENT MAJOR DEPRESSIVE DISORDER, WITHOUT PSYCHOTIC FEATURES (HCC): ICD-10-CM

## 2019-11-25 DIAGNOSIS — F51.05 INSOMNIA DUE TO MENTAL CONDITION: ICD-10-CM

## 2019-11-25 DIAGNOSIS — F41.1 GENERALIZED ANXIETY DISORDER: ICD-10-CM

## 2019-11-26 RX ORDER — QUETIAPINE FUMARATE 400 MG/1
400 TABLET, FILM COATED ORAL NIGHTLY
Qty: 30 TABLET | Refills: 0 | Status: SHIPPED | OUTPATIENT
Start: 2019-11-26

## 2019-11-26 RX ORDER — QUETIAPINE FUMARATE 400 MG/1
400 TABLET, FILM COATED ORAL NIGHTLY
Qty: 30 TABLET | Refills: 0 | Status: SHIPPED | OUTPATIENT
Start: 2019-11-26 | End: 2019-11-26 | Stop reason: SDUPTHER

## 2019-12-09 DIAGNOSIS — F41.1 GENERALIZED ANXIETY DISORDER: ICD-10-CM

## 2019-12-09 RX ORDER — BUSPIRONE HYDROCHLORIDE 15 MG/1
TABLET ORAL
Qty: 30 TABLET | Refills: 5 | Status: SHIPPED | OUTPATIENT
Start: 2019-12-09

## 2020-01-06 DIAGNOSIS — F33.2 SEVERE EPISODE OF RECURRENT MAJOR DEPRESSIVE DISORDER, WITHOUT PSYCHOTIC FEATURES (HCC): ICD-10-CM

## 2020-01-06 RX ORDER — ESCITALOPRAM OXALATE 20 MG/1
TABLET ORAL
Qty: 30 TABLET | Refills: 2 | Status: SHIPPED | OUTPATIENT
Start: 2020-01-06

## 2020-03-02 DIAGNOSIS — F51.05 INSOMNIA DUE TO MENTAL CONDITION: ICD-10-CM

## 2020-03-02 DIAGNOSIS — F33.2 SEVERE EPISODE OF RECURRENT MAJOR DEPRESSIVE DISORDER, WITHOUT PSYCHOTIC FEATURES (HCC): ICD-10-CM

## 2020-03-02 DIAGNOSIS — F41.1 GENERALIZED ANXIETY DISORDER: ICD-10-CM

## 2020-03-02 RX ORDER — QUETIAPINE FUMARATE 400 MG/1
400 TABLET, FILM COATED ORAL NIGHTLY
Qty: 30 TABLET | Refills: 0 | OUTPATIENT
Start: 2020-03-02

## 2020-06-02 DIAGNOSIS — F41.1 GENERALIZED ANXIETY DISORDER: ICD-10-CM

## 2020-06-02 RX ORDER — BUSPIRONE HYDROCHLORIDE 15 MG/1
TABLET ORAL
Qty: 30 TABLET | Refills: 5 | OUTPATIENT
Start: 2020-06-02

## 2024-10-01 ENCOUNTER — TELEPHONE (OUTPATIENT)
Dept: SURGERY | Facility: CLINIC | Age: 64
End: 2024-10-01
Payer: MEDICARE

## 2024-10-01 NOTE — TELEPHONE ENCOUNTER
S/w the pt regarding a referral we received from Dr. Denae House for a colonoscopy-pt stated she did not want to schedule at this time. I have faxed this referral back to the referring office with this information.

## 2024-11-05 NOTE — PROGRESS NOTES
[facility]   Consult  Maru Meyer  1960    VISIT DATE:  11/07/24    PCP:   Haleigh Gomez MD  [address] [facility] [address]        CC:  Shortness of Breath      Problem List:  Hypertension  Hypothyroid  Hyperlipidemia  GERD  Hoarseness and unintentional weight loss  Throat nodules  Reports negative MRI brain  Former smoker  On medical marijuana  Colon  Ca    I have seen her  previously    History of Present Illness:  Maru Meyer  Is a 63 y.o. female with pertinent cardiac history detailed above.  Patient had seen [facility] and was planned to have an echo and a coronary CTA to evaluate dyspnea on exertion and for preprocedural clearance for an EGD.  She was having severe limiting dyspnea.  Does not have CP.  She did not have the ECHO and CTA.  She has since  seen ENT and has been shown to have an issue with her vocal cords, reported no malignancy.  Her  EKG is abnormal with  T wave inversions, this was seen going back to 2016.  She has had PFTs unable to complete  them due to vocal cord dysfunction.  She has multiple cardiac risk factors.         Patient Active Problem List    Diagnosis Date Noted    DDD (degenerative disc disease), lumbar 10/15/2019       No Known Allergies    Social History     Socioeconomic History    Marital status:    Tobacco Use    Smoking status: Former    Smokeless tobacco: Never   Substance and Sexual Activity    Alcohol use: No    Drug use: No    Sexual activity: Defer       Family History   Problem Relation Age of Onset    Thyroid cancer Mother     Heart disease Mother     Stroke Father     Cancer Father     Cancer Maternal Uncle     Heart disease Maternal Grandmother     Heart attack Paternal Grandmother     Heart disease Paternal Grandmother     Cancer Paternal Grandfather        Current Medications:    Current Outpatient Medications:     busPIRone (BUSPAR) 15 MG tablet, TAKE 1 TABLET AT BEDTIME, Disp: 30 tablet, Rfl: 5     "CloNIDine (CATAPRES) 0.1 MG tablet, Take 1 tablet by mouth Daily., Disp: , Rfl: 5    escitalopram (LEXAPRO) 20 MG tablet, TAKE 1 TABLET DAILY, Disp: 30 tablet, Rfl: 2    FENOFIBRATE MICRONIZED PO, Take 145 mg by mouth Daily., Disp: , Rfl: 5    hydrOXYzine (ATARAX) 25 MG tablet, Take 1-2 tablets by mouth every 6 hours as needed for anxiety/itching, Disp: 180 tablet, Rfl: 2    levothyroxine (SYNTHROID, LEVOTHROID) 150 MCG tablet, Take 1 tablet by mouth Daily., Disp: , Rfl: 5    lisinopril (PRINIVIL,ZESTRIL) 10 MG tablet, Take 1 tablet by mouth Daily., Disp: , Rfl:     metFORMIN (GLUCOPHAGE) 500 MG tablet, Take 1 tablet by mouth 2 (Two) Times a Day With Meals., Disp: , Rfl:     omeprazole (priLOSEC) 40 MG capsule, Take 1 capsule by mouth Daily., Disp: , Rfl: 0    QUEtiapine (SEROquel) 400 MG tablet, TAKE 1 TABLET BY MOUTH EVERY NIGHT, Disp: 30 tablet, Rfl: 0    vitamin D (ERGOCALCIFEROL) 1.25 MG (34921 UT) capsule capsule, Take 1 capsule by mouth 1 (One) Time Per Week., Disp: , Rfl:     atenolol (TENORMIN) 50 MG tablet, Take 50 mg by mouth Daily. (Patient not taking: Reported on 11/7/2024), Disp: , Rfl: 5    dicyclomine (BENTYL) 10 MG capsule, TAKE 1 CAPSULE EVERY 6 HOURS AS NEEDED FOR SPASMS (Patient not taking: Reported on 11/7/2024), Disp: , Rfl: 1    methylPREDNISolone (MEDROL, CHEMO,) 4 MG tablet, TAKE PER PACKAGE INSTRUCTIONS (Patient not taking: Reported on 11/7/2024), Disp: , Rfl: 0    rosuvastatin (CRESTOR) 40 MG tablet, Take 1 tablet by mouth Daily., Disp: 30 tablet, Rfl: 11     Review of Systems   HENT:  Positive for hoarse voice.    Cardiovascular:  Positive for dyspnea on exertion. Negative for chest pain.   Respiratory:  Positive for cough and shortness of breath.        Vitals:    11/07/24 0911   BP: 132/86   BP Location: Left arm   Patient Position: Sitting   Pulse: 70   SpO2: 98%   Weight: 82.8 kg (182 lb 9.6 oz)   Height: 167.6 cm (66\")       Physical Exam  Constitutional:       Appearance: Normal " "appearance.   Neck:      Vascular: No carotid bruit.   Cardiovascular:      Rate and Rhythm: Normal rate and regular rhythm.      Pulses: Normal pulses.      Heart sounds: Normal heart sounds.   Pulmonary:      Effort: Pulmonary effort is normal.      Breath sounds: Normal breath sounds.   Musculoskeletal:      Right lower leg: No edema.      Left lower leg: No edema.   Neurological:      Mental Status: She is alert.         Diagnostic Data:    ECG 12 Lead    Date/Time: 11/7/2024 9:17 AM  Performed by: Willard Escobedo MD    Authorized by: Willard Escobedo MD  Comparison: compared with previous ECG from 10/16/2016  Rhythm: sinus rhythm  Rate: normal  BPM: 63  T inversion: V1-V6        No results found for: \"CHLPL\", \"TRIG\", \"HDL\", \"LDLDIRECT\"  No results found for: \"GLUCOSE\", \"BUN\", \"CREATININE\", \"NA\", \"K\", \"CL\", \"CO2\"  No results found for: \"HGBA1C\"  No results found for: \"WBC\", \"HGB\", \"HCT\", \"PLT\"    Assessment:   Diagnosis Plan   1. Hypertension, unspecified type        2. Hyperlipidemia, unspecified hyperlipidemia type        3. Dyspnea on exertion  Adult Transthoracic Echo Complete W/ Cont if Necessary Per Protocol    Stress Test With Myocardial Perfusion One Day          Plan:      Dyspnea on exertion  Echo and coronary CTA previously planned.  She did not have these done  Multiple cardiac risk  factors  Obtain  echo  and  Stress test ordered to evaluate  She has chronic  TWI  in v1-v6  Hypertension  Patient on lisinopril, clonidine  Controlled, no changes made  HLD  LDL of 99 in April 2024  On simvastatin 40 mg and fenofibrate 145  Switch to crestor 40mg  Diabetes  Vocal cord paralysis  Following  with  ENT  Seeing some improvement            Advance Care Planning   ACP discussion was held with the patient during this visit. Patient does not have an advance directive, declines further assistance.       Willard Escobedo MD FAC     "

## 2024-11-07 ENCOUNTER — OFFICE VISIT (OUTPATIENT)
Dept: CARDIOLOGY | Facility: CLINIC | Age: 64
End: 2024-11-07
Payer: MEDICARE

## 2024-11-07 VITALS
HEART RATE: 70 BPM | WEIGHT: 182.6 LBS | BODY MASS INDEX: 29.35 KG/M2 | HEIGHT: 66 IN | OXYGEN SATURATION: 98 % | DIASTOLIC BLOOD PRESSURE: 86 MMHG | SYSTOLIC BLOOD PRESSURE: 132 MMHG

## 2024-11-07 DIAGNOSIS — I10 HYPERTENSION, UNSPECIFIED TYPE: Primary | ICD-10-CM

## 2024-11-07 DIAGNOSIS — R06.09 DYSPNEA ON EXERTION: ICD-10-CM

## 2024-11-07 DIAGNOSIS — E78.5 HYPERLIPIDEMIA, UNSPECIFIED HYPERLIPIDEMIA TYPE: ICD-10-CM

## 2024-11-07 PROCEDURE — 93000 ELECTROCARDIOGRAM COMPLETE: CPT | Performed by: INTERNAL MEDICINE

## 2024-11-07 PROCEDURE — 99204 OFFICE O/P NEW MOD 45 MIN: CPT | Performed by: INTERNAL MEDICINE

## 2024-11-07 RX ORDER — ERGOCALCIFEROL 1.25 MG/1
50000 CAPSULE, LIQUID FILLED ORAL WEEKLY
COMMUNITY

## 2024-11-07 RX ORDER — LISINOPRIL 10 MG/1
10 TABLET ORAL DAILY
COMMUNITY

## 2024-11-07 RX ORDER — ROSUVASTATIN CALCIUM 40 MG/1
40 TABLET, COATED ORAL DAILY
Qty: 30 TABLET | Refills: 11 | Status: SHIPPED | OUTPATIENT
Start: 2024-11-07

## 2025-01-09 NOTE — PROGRESS NOTES
Patient: Maru Meyer    YOB: 1960    Date: 01/13/2025    Primary Care Provider: Haleigh Gomez MD    Chief Complaint   Patient presents with    Difficulty Swallowing       SUBJECTIVE:    History of present illness: About a year ago the patient states that she had a 3 to 4-month history of significant coughing.  Since then she has developed ruptured vocal cords.  Also has had an 80 pound weight loss that is unintentional but she states that she is eating well.  She was found to have microaspiration on speech therapy evaluation.  She otherwise states that her food goes down fine.  Not having any other significant GI complaints    The following portions of the patient's history were reviewed and updated as appropriate: allergies, current medications, past family history, past medical history, past social history, past surgical history and problem list.      Review of Systems   Constitutional:  Negative for chills, fever and unexpected weight change.   HENT:  Positive for trouble swallowing and voice change. Negative for hearing loss.    Eyes:  Negative for visual disturbance.   Respiratory:  Positive for choking. Negative for apnea, cough, chest tightness, shortness of breath and wheezing.    Cardiovascular:  Negative for chest pain, palpitations and leg swelling.   Gastrointestinal:  Negative for abdominal distention, abdominal pain, anal bleeding, blood in stool, constipation, diarrhea, nausea, rectal pain and vomiting.   Endocrine: Negative for cold intolerance and heat intolerance.   Genitourinary:  Negative for difficulty urinating, dysuria and flank pain.   Musculoskeletal:  Negative for back pain and gait problem.   Skin:  Negative for color change, rash and wound.   Neurological:  Negative for dizziness, syncope, speech difficulty, weakness, light-headedness, numbness and headaches.   Hematological:  Negative for adenopathy. Does not bruise/bleed easily.   Psychiatric/Behavioral:  Negative  for confusion. The patient is not nervous/anxious.          Allergies:  No Known Allergies    Medications:    Current Outpatient Medications:     atenolol (TENORMIN) 50 MG tablet, Take 1 tablet by mouth Daily., Disp: , Rfl: 5    busPIRone (BUSPAR) 15 MG tablet, TAKE 1 TABLET AT BEDTIME, Disp: 30 tablet, Rfl: 5    cloNIDine (CATAPRES) 0.1 MG tablet, Take 1 tablet by mouth Daily., Disp: , Rfl:     dicyclomine (BENTYL) 10 MG capsule, , Disp: , Rfl: 1    escitalopram (LEXAPRO) 20 MG tablet, TAKE 1 TABLET DAILY, Disp: 30 tablet, Rfl: 2    fenofibrate (TRICOR) 145 MG tablet, Take 1 tablet by mouth Daily., Disp: , Rfl:     hydrOXYzine (ATARAX) 25 MG tablet, Take 1-2 tablets by mouth every 6 hours as needed for anxiety/itching, Disp: 180 tablet, Rfl: 2    levothyroxine (SYNTHROID, LEVOTHROID) 150 MCG tablet, Take 1 tablet by mouth Daily., Disp: , Rfl: 5    lisinopril (PRINIVIL,ZESTRIL) 10 MG tablet, Take 1 tablet by mouth Daily., Disp: , Rfl:     metFORMIN (GLUCOPHAGE) 500 MG tablet, Take 1 tablet by mouth 2 (Two) Times a Day With Meals., Disp: , Rfl:     omeprazole (priLOSEC) 40 MG capsule, Take 1 capsule by mouth Daily., Disp: , Rfl: 0    QUEtiapine (SEROquel) 400 MG tablet, TAKE 1 TABLET BY MOUTH EVERY NIGHT, Disp: 30 tablet, Rfl: 0    rosuvastatin (CRESTOR) 40 MG tablet, Take 1 tablet by mouth Daily., Disp: 30 tablet, Rfl: 11    vitamin D (ERGOCALCIFEROL) 1.25 MG (50472 UT) capsule capsule, Take 1 capsule by mouth 1 (One) Time Per Week., Disp: , Rfl:     History:  Past Medical History:   Diagnosis Date    Anxiety     Cancer     colon    Colon cancer     Disease of thyroid gland     Hyperlipidemia     Hypertension        Past Surgical History:   Procedure Laterality Date    COLECTOMY PARTIAL / TOTAL      MASTECTOMY      THYROIDECTOMY         Family History   Problem Relation Age of Onset    Thyroid cancer Mother     Heart disease Mother     Stroke Father     Cancer Father     Cancer Maternal Uncle     Heart disease  "Maternal Grandmother     Heart attack Paternal Grandmother     Heart disease Paternal Grandmother     Cancer Paternal Grandfather        Social History     Tobacco Use    Smoking status: Former     Passive exposure: Past    Smokeless tobacco: Never   Vaping Use    Vaping status: Never Used   Substance Use Topics    Alcohol use: No    Drug use: No        OBJECTIVE:    Vital Signs:   Vitals:    01/13/25 1322   BP: 138/70   Pulse: 76   Resp: 20   Temp: 98.2 °F (36.8 °C)   TempSrc: Temporal   SpO2: 97%   Weight: 77.1 kg (170 lb)   Height: 167.6 cm (66\")       Physical Exam:       General Appearance:    Alert, cooperative, in no acute distress.  Does have some difficulty talking   Head:    Normocephalic, without obvious abnormality, atraumatic   Eyes:            Normal.  No scleral icterus.  PERRLA    Lungs:     Clear to auscultation,respirations regular, even and                  unlabored    Heart:    Regular rhythm and normal rate,    Abdomen:   Soft and nontender   Extremities:   Moves all extremities well, no edema, no cyanosis, no             redness   Skin:   No bleeding, bruising or rash   Neurologic:   Normal without gross deficits.   Psychiatric: No evidence of depression or anxiety          Results Review:   I reviewed the patient's new clinical results.  I reviewed the speech therapy results    Review of Systems was reviewed and confirmed as accurate as documented by the MA.    ASSESSMENT/PLAN:    1. Abnormal weight loss    2. History of colon cancer        Due to her abnormal weight loss recommend an EGD and flexible sigmoidoscopy.  She has had a previous subtotal colectomy remotely.  Her last flexible sigmoidoscopy was at least 5 years ago.  I explained the procedures to the patient as well as the risks of bleeding and perforation and they understand the ramifications of these potential complications and they wish to proceed.          Electronically signed by Teja Beltrán MD  01/13/25          "

## 2025-01-13 ENCOUNTER — OFFICE VISIT (OUTPATIENT)
Dept: SURGERY | Facility: CLINIC | Age: 65
End: 2025-01-13
Payer: MEDICARE

## 2025-01-13 VITALS
DIASTOLIC BLOOD PRESSURE: 70 MMHG | BODY MASS INDEX: 27.32 KG/M2 | SYSTOLIC BLOOD PRESSURE: 138 MMHG | HEART RATE: 76 BPM | TEMPERATURE: 98.2 F | WEIGHT: 170 LBS | RESPIRATION RATE: 20 BRPM | OXYGEN SATURATION: 97 % | HEIGHT: 66 IN

## 2025-01-13 DIAGNOSIS — Z85.038 HISTORY OF COLON CANCER: ICD-10-CM

## 2025-01-13 DIAGNOSIS — R63.4 ABNORMAL WEIGHT LOSS: Primary | ICD-10-CM

## 2025-01-13 PROCEDURE — 99204 OFFICE O/P NEW MOD 45 MIN: CPT | Performed by: SURGERY

## 2025-01-13 PROCEDURE — 1159F MED LIST DOCD IN RCRD: CPT | Performed by: SURGERY

## 2025-01-13 PROCEDURE — 1160F RVW MEDS BY RX/DR IN RCRD: CPT | Performed by: SURGERY

## 2025-01-13 RX ORDER — FENOFIBRATE 145 MG/1
1 TABLET, COATED ORAL DAILY
COMMUNITY

## 2025-01-13 RX ORDER — ERGOCALCIFEROL 1.25 MG/1
1 CAPSULE, LIQUID FILLED ORAL WEEKLY
COMMUNITY

## 2025-01-13 RX ORDER — CLONIDINE HYDROCHLORIDE 0.1 MG/1
1 TABLET ORAL DAILY
COMMUNITY

## 2025-02-10 ENCOUNTER — TELEPHONE (OUTPATIENT)
Dept: SURGERY | Facility: CLINIC | Age: 65
End: 2025-02-10

## 2025-02-10 ENCOUNTER — OUTSIDE FACILITY SERVICE (OUTPATIENT)
Dept: SURGERY | Facility: CLINIC | Age: 65
End: 2025-02-10
Payer: MEDICARE

## 2025-02-10 NOTE — TELEPHONE ENCOUNTER
SPOKE WITH SY @ [facility]. PT STATED SHE WAS SICK AND WOULD NEED TO RESCHEDULE THE PROCEDURE WITH YOSVANY ERICKSON.

## 2025-02-11 NOTE — TELEPHONE ENCOUNTER
PT IS IN THE ER, PT SHOULD BE ADMITTED AN PROCEDURE WILL STILL BE DONE TOMORROW PT IS STILL ON THE SCHEDULE FOR 02/12/2025 WITH DR BAR

## 2025-03-24 ENCOUNTER — TELEPHONE (OUTPATIENT)
Dept: SURGERY | Facility: CLINIC | Age: 65
End: 2025-03-24
Payer: MEDICARE

## 2025-04-10 ENCOUNTER — TELEPHONE (OUTPATIENT)
Dept: CARDIOLOGY | Facility: CLINIC | Age: 65
End: 2025-04-10
Payer: MEDICARE

## 2025-04-10 NOTE — TELEPHONE ENCOUNTER
Received new referral from Dr. Cervantes for [facility]. Per KT, follow up needed for first available in [facility]. Spoke w/ patient's spouse. He will have patient call back to schedule.

## 2025-04-10 NOTE — TELEPHONE ENCOUNTER
Patient returned call. Follow up w/ [facility] moved to 4/17/25 in [facility]. Referral indexed to media.

## 2025-04-17 ENCOUNTER — OFFICE VISIT (OUTPATIENT)
Dept: CARDIOLOGY | Facility: CLINIC | Age: 65
End: 2025-04-17
Payer: MEDICARE

## 2025-04-17 VITALS
HEIGHT: 66 IN | OXYGEN SATURATION: 97 % | SYSTOLIC BLOOD PRESSURE: 127 MMHG | BODY MASS INDEX: 25.13 KG/M2 | HEART RATE: 71 BPM | DIASTOLIC BLOOD PRESSURE: 73 MMHG | WEIGHT: 156.4 LBS

## 2025-04-17 DIAGNOSIS — E78.5 HYPERLIPIDEMIA, UNSPECIFIED HYPERLIPIDEMIA TYPE: ICD-10-CM

## 2025-04-17 DIAGNOSIS — I10 HYPERTENSION, UNSPECIFIED TYPE: Primary | ICD-10-CM

## 2025-04-17 RX ORDER — DIAZEPAM 2 MG/1
2 TABLET ORAL
COMMUNITY

## 2025-04-17 NOTE — PROGRESS NOTES
[facility]  Follow Up Visit  Maru Meyer  1960    VISIT DATE:  04/17/25    PCP:   Haleigh Gomez MD  [address] [facility] [address]          CC:  Fatigue and Dilated Left Atrium      Problem List:  Hypertension  Hypothyroid  Hyperlipidemia  GERD  Hoarseness and unintentional weight loss  Throat nodules  Reports negative MRI brain  Former smoker  On medical marijuana  Colon  Ca     I have seen her  previously    History of Present Illness:  Maru Meyer  Is a 64 y.o. female with pertinent cardiac history detailed above.  Last visit echo and stress test were ordered.  These were just done this month at [facility] and fortunately were normal.  Had unremarkable stress test and echo.  Has difficulty with her speech.  Has seen both neuo and ENT.  She did have an MRI of her brain that was unremarkable.  BP  WNL.  On tx for HLD including statin and fenofibrate      Patient Active Problem List    Diagnosis Date Noted    DDD (degenerative disc disease), lumbar 10/15/2019       No Known Allergies    Social History     Socioeconomic History    Marital status:    Tobacco Use    Smoking status: Former     Passive exposure: Past    Smokeless tobacco: Never   Vaping Use    Vaping status: Never Used   Substance and Sexual Activity    Alcohol use: No    Drug use: No    Sexual activity: Defer       Family History   Problem Relation Age of Onset    Thyroid cancer Mother     Heart disease Mother     Stroke Father     Cancer Father     Cancer Maternal Uncle     Heart disease Maternal Grandmother     Heart attack Paternal Grandmother     Heart disease Paternal Grandmother     Cancer Paternal Grandfather        Current Medications:    Current Outpatient Medications:     atenolol (TENORMIN) 50 MG tablet, Take 1 tablet by mouth Daily., Disp: , Rfl: 5    busPIRone (BUSPAR) 15 MG tablet, TAKE 1 TABLET AT BEDTIME, Disp: 30 tablet, Rfl: 5    cloNIDine (CATAPRES) 0.1 MG tablet, Take 1  "tablet by mouth Daily., Disp: , Rfl:     diazePAM (VALIUM) 2 MG tablet, 1 tablet., Disp: , Rfl:     dicyclomine (BENTYL) 10 MG capsule, , Disp: , Rfl: 1    escitalopram (LEXAPRO) 20 MG tablet, TAKE 1 TABLET DAILY, Disp: 30 tablet, Rfl: 2    fenofibrate (TRICOR) 145 MG tablet, Take 1 tablet by mouth Daily., Disp: , Rfl:     hydrOXYzine (ATARAX) 25 MG tablet, Take 1-2 tablets by mouth every 6 hours as needed for anxiety/itching, Disp: 180 tablet, Rfl: 2    levothyroxine (SYNTHROID, LEVOTHROID) 150 MCG tablet, Take 1 tablet by mouth Daily., Disp: , Rfl: 5    lisinopril (PRINIVIL,ZESTRIL) 10 MG tablet, Take 1 tablet by mouth Daily., Disp: , Rfl:     omeprazole (priLOSEC) 40 MG capsule, Take 1 capsule by mouth Daily., Disp: , Rfl: 0    QUEtiapine (SEROquel) 400 MG tablet, TAKE 1 TABLET BY MOUTH EVERY NIGHT, Disp: 30 tablet, Rfl: 0    rosuvastatin (CRESTOR) 40 MG tablet, Take 1 tablet by mouth Daily., Disp: 30 tablet, Rfl: 11    vitamin D (ERGOCALCIFEROL) 1.25 MG (72786 UT) capsule capsule, Take 1 capsule by mouth 1 (One) Time Per Week., Disp: , Rfl:     metFORMIN (GLUCOPHAGE) 500 MG tablet, Take 1 tablet by mouth 2 (Two) Times a Day With Meals. (Patient not taking: Reported on 4/17/2025), Disp: , Rfl:      Review of Systems   Constitutional: Positive for malaise/fatigue.   Cardiovascular: Negative.    Respiratory:  Negative for shortness of breath.        Vitals:    04/17/25 0942   BP: 127/73   BP Location: Right arm   Patient Position: Sitting   Pulse: 71   SpO2: 97%   Weight: 70.9 kg (156 lb 6.4 oz)   Height: 167.6 cm (66\")       Physical Exam  Constitutional:       Appearance: Normal appearance.   Neck:      Vascular: No carotid bruit.   Cardiovascular:      Rate and Rhythm: Normal rate and regular rhythm.      Pulses: Normal pulses.      Heart sounds: Normal heart sounds.   Pulmonary:      Effort: Pulmonary effort is normal.      Breath sounds: Normal breath sounds.   Neurological:      Mental Status: She is alert. " "        Diagnostic Data:  Procedures  No results found for: \"CHLPL\", \"TRIG\", \"HDL\", \"LDLDIRECT\"  No results found for: \"GLUCOSE\", \"BUN\", \"CREATININE\", \"NA\", \"K\", \"CL\", \"CO2\"  No results found for: \"HGBA1C\"  No results found for: \"WBC\", \"HGB\", \"HCT\", \"PLT\"    Assessment:   Diagnosis Plan   1. Hypertension, unspecified type        2. Hyperlipidemia, unspecified hyperlipidemia type            Plan:    Dyspnea on exertion  Multiple cardiac risk  factors  Echo shows EF 60 to 65%, grade 1 diastolic dysfunction, mild LVH.  No significant valve disease  Normal stress test with no evidence of ischemia and normal EF 72%.  She has chronic  TWI  in v1-v6  Hypertension  Patient on lisinopril, clonidine  Controlled, no changes made  HLD  LDL 99 April 2024  Switched to Crestor at last visit.  Also on fenofibrate  Diabetes  Per her PCP  Vocal cord paralysis  Following  with  ENT and neuro  MRI brain was within normal limits.  Does have cervical spinal canal stenosis      ACP discussion was held with the patient during this visit. Patient does not have an advance directive, declines further assistance.      Willard Escobedo MD FACC     "

## 2025-05-07 ENCOUNTER — TELEPHONE (OUTPATIENT)
Dept: SURGERY | Facility: CLINIC | Age: 65
End: 2025-05-07
Payer: MEDICARE

## 2025-05-07 NOTE — TELEPHONE ENCOUNTER
LEFT VM FOR PT TO RETURN MY CALL TO SEE IF THEY WOULD LIKE TO RESCHEDULE HER EGD AN FLEX SIG FROM 02/10/2025 @ [facility]

## 2025-05-30 ENCOUNTER — TELEPHONE (OUTPATIENT)
Dept: SURGERY | Facility: CLINIC | Age: 65
End: 2025-05-30
Payer: MEDICARE

## 2025-05-30 NOTE — TELEPHONE ENCOUNTER
PT HAS HAMILTON RESCHEDULED O 07/14/2025 @ [facility] SY AT [facility] WAS NOTIFIED. PT AND HER  ARE AWARE

## 2025-07-25 ENCOUNTER — TELEPHONE (OUTPATIENT)
Dept: SURGERY | Facility: CLINIC | Age: 65
End: 2025-07-25
Payer: MEDICARE

## 2025-07-25 NOTE — TELEPHONE ENCOUNTER
PER SY @ [facility] PER PT'S FAMILY WENDY IS ON A VENT IN A [facility] PT'S FAMILY DID NOT TELL WHICH HOSPITAL THAT PT IS IN